# Patient Record
Sex: MALE | Race: WHITE | NOT HISPANIC OR LATINO | Employment: UNEMPLOYED | URBAN - NONMETROPOLITAN AREA
[De-identification: names, ages, dates, MRNs, and addresses within clinical notes are randomized per-mention and may not be internally consistent; named-entity substitution may affect disease eponyms.]

---

## 2019-03-20 ENCOUNTER — OFFICE VISIT (OUTPATIENT)
Dept: FAMILY MEDICINE CLINIC | Facility: CLINIC | Age: 59
End: 2019-03-20

## 2019-03-20 ENCOUNTER — APPOINTMENT (OUTPATIENT)
Dept: LAB | Facility: HOSPITAL | Age: 59
End: 2019-03-20

## 2019-03-20 VITALS
HEIGHT: 68 IN | WEIGHT: 156.9 LBS | BODY MASS INDEX: 23.78 KG/M2 | OXYGEN SATURATION: 99 % | HEART RATE: 66 BPM | SYSTOLIC BLOOD PRESSURE: 110 MMHG | DIASTOLIC BLOOD PRESSURE: 80 MMHG

## 2019-03-20 DIAGNOSIS — Z13.1 SCREENING FOR DIABETES MELLITUS (DM): ICD-10-CM

## 2019-03-20 DIAGNOSIS — R73.9 HYPERGLYCEMIA: ICD-10-CM

## 2019-03-20 DIAGNOSIS — R53.83 FATIGUE, UNSPECIFIED TYPE: ICD-10-CM

## 2019-03-20 DIAGNOSIS — Z11.59 NEED FOR HEPATITIS C SCREENING TEST: ICD-10-CM

## 2019-03-20 DIAGNOSIS — Z13.220 SCREENING FOR HYPERLIPIDEMIA: ICD-10-CM

## 2019-03-20 DIAGNOSIS — Z00.00 ANNUAL PHYSICAL EXAM: Primary | ICD-10-CM

## 2019-03-20 DIAGNOSIS — Z13.0 SCREENING FOR IRON DEFICIENCY ANEMIA: ICD-10-CM

## 2019-03-20 LAB
ALBUMIN SERPL-MCNC: 4.2 G/DL (ref 3.4–4.8)
ALBUMIN/GLOB SERPL: 1.3 G/DL (ref 1.1–1.8)
ALP SERPL-CCNC: 78 U/L (ref 38–126)
ALT SERPL W P-5'-P-CCNC: 28 U/L (ref 21–72)
ANION GAP SERPL CALCULATED.3IONS-SCNC: 7 MMOL/L (ref 5–15)
ARTICHOKE IGE QN: 115 MG/DL (ref 1–129)
AST SERPL-CCNC: 49 U/L (ref 17–59)
BASOPHILS # BLD AUTO: 0.07 10*3/MM3 (ref 0–0.2)
BASOPHILS NFR BLD AUTO: 0.9 % (ref 0–1.5)
BILIRUB SERPL-MCNC: 0.4 MG/DL (ref 0.2–1.3)
BUN BLD-MCNC: 14 MG/DL (ref 7–21)
BUN/CREAT SERPL: 15.7 (ref 7–25)
CALCIUM SPEC-SCNC: 9.4 MG/DL (ref 8.4–10.2)
CHLORIDE SERPL-SCNC: 98 MMOL/L (ref 95–110)
CHOLEST SERPL-MCNC: 225 MG/DL (ref 0–199)
CO2 SERPL-SCNC: 29 MMOL/L (ref 22–31)
CREAT BLD-MCNC: 0.89 MG/DL (ref 0.7–1.3)
DEPRECATED RDW RBC AUTO: 37.6 FL (ref 37–54)
EOSINOPHIL # BLD AUTO: 0.15 10*3/MM3 (ref 0–0.4)
EOSINOPHIL NFR BLD AUTO: 1.9 % (ref 0.3–6.2)
ERYTHROCYTE [DISTWIDTH] IN BLOOD BY AUTOMATED COUNT: 12 % (ref 12.3–15.4)
GFR SERPL CREATININE-BSD FRML MDRD: 87 ML/MIN/1.73 (ref 56–130)
GLOBULIN UR ELPH-MCNC: 3.2 GM/DL (ref 2.3–3.5)
GLUCOSE BLD-MCNC: 90 MG/DL (ref 60–100)
HBA1C MFR BLD: 5.1 % (ref 4–5.6)
HCT VFR BLD AUTO: 42.4 % (ref 37.5–51)
HCV AB SER DONR QL: NEGATIVE
HDLC SERPL-MCNC: 68 MG/DL (ref 60–200)
HGB BLD-MCNC: 14.6 G/DL (ref 13–17.7)
IMM GRANULOCYTES # BLD AUTO: 0.02 10*3/MM3 (ref 0–0.05)
IMM GRANULOCYTES NFR BLD AUTO: 0.3 % (ref 0–0.5)
LDLC/HDLC SERPL: 1.91 {RATIO} (ref 0–3.55)
LYMPHOCYTES # BLD AUTO: 1.88 10*3/MM3 (ref 0.7–3.1)
LYMPHOCYTES NFR BLD AUTO: 24 % (ref 19.6–45.3)
MCH RBC QN AUTO: 29.6 PG (ref 26.6–33)
MCHC RBC AUTO-ENTMCNC: 34.4 G/DL (ref 31.5–35.7)
MCV RBC AUTO: 86 FL (ref 79–97)
MONOCYTES # BLD AUTO: 0.58 10*3/MM3 (ref 0.1–0.9)
MONOCYTES NFR BLD AUTO: 7.4 % (ref 5–12)
NEUTROPHILS # BLD AUTO: 5.13 10*3/MM3 (ref 1.4–7)
NEUTROPHILS NFR BLD AUTO: 65.5 % (ref 42.7–76)
NRBC BLD AUTO-RTO: 0 /100 WBC (ref 0–0)
PLATELET # BLD AUTO: 286 10*3/MM3 (ref 140–450)
PMV BLD AUTO: 11 FL (ref 6–12)
POTASSIUM BLD-SCNC: 4.2 MMOL/L (ref 3.5–5.1)
PROT SERPL-MCNC: 7.4 G/DL (ref 6.3–8.6)
RBC # BLD AUTO: 4.93 10*6/MM3 (ref 4.14–5.8)
SODIUM BLD-SCNC: 134 MMOL/L (ref 137–145)
TRIGL SERPL-MCNC: 136 MG/DL (ref 20–199)
WBC NRBC COR # BLD: 7.83 10*3/MM3 (ref 3.4–10.8)

## 2019-03-20 PROCEDURE — 99386 PREV VISIT NEW AGE 40-64: CPT | Performed by: FAMILY MEDICINE

## 2019-03-20 PROCEDURE — 86803 HEPATITIS C AB TEST: CPT | Performed by: FAMILY MEDICINE

## 2019-03-20 PROCEDURE — 83036 HEMOGLOBIN GLYCOSYLATED A1C: CPT | Performed by: FAMILY MEDICINE

## 2019-03-20 PROCEDURE — 80053 COMPREHEN METABOLIC PANEL: CPT | Performed by: FAMILY MEDICINE

## 2019-03-20 PROCEDURE — 36415 COLL VENOUS BLD VENIPUNCTURE: CPT | Performed by: FAMILY MEDICINE

## 2019-03-20 PROCEDURE — 80061 LIPID PANEL: CPT | Performed by: FAMILY MEDICINE

## 2019-03-20 PROCEDURE — 85025 COMPLETE CBC W/AUTO DIFF WBC: CPT | Performed by: FAMILY MEDICINE

## 2019-03-20 NOTE — PROGRESS NOTES
Subjective:     Bhavesh Pearson is a 58 y.o. male who presents to [facility] care and for an annual checkup:    She is having no problems today.  He reports he is very active.  He rides his bicycle around town.    Preventative:    On osteoporosis therapy?Not Indicated     Past Medical Hx:  History reviewed. No pertinent past medical history.    Past Surgical Hx:  Past Surgical History:   Procedure Laterality Date   • CLAVICLE SURGERY         Health Maintenance:  Health Maintenance   Topic Date Due   • TDAP/TD VACCINES (1 - Tdap) 11/21/1978   • ZOSTER VACCINE (1 of 2) 11/21/2009   • MEDICARE ANNUAL WELLNESS  03/20/2019   • COLONOSCOPY  11/22/2021   • HEPATITIS C SCREENING  Completed   • INFLUENZA VACCINE  Completed       Current Meds:  No current outpatient medications on file.    Allergies:  Patient has no known allergies.    Family Hx:  Family History   Problem Relation Age of Onset   • Throat cancer Mother    • Hypertension Mother    • Hypertension Father    • Coronary artery disease Father    • Diabetes Sister         Social History:  Social History     Socioeconomic History   • Marital status: Single     Spouse name: Not on file   • Number of children: Not on file   • Years of education: Not on file   • Highest education level: Not on file   Tobacco Use   • Smoking status: Never Smoker   • Smokeless tobacco: Never Used   Substance and Sexual Activity   • Alcohol use: No     Frequency: Never   • Drug use: No   • Sexual activity: Defer       Review of Systems  Review of Systems   Constitutional: Negative for chills and fever.   HENT: Negative for congestion, ear pain, hearing loss, rhinorrhea, sore throat and trouble swallowing.    Eyes: Negative for pain and visual disturbance.   Respiratory: Negative for cough, shortness of breath and wheezing.    Cardiovascular: Negative for chest pain and palpitations.   Gastrointestinal: Negative for abdominal pain, constipation, diarrhea, nausea and vomiting.  "  Genitourinary: Negative for dysuria and hematuria.   Musculoskeletal: Negative for back pain and neck pain.   Skin: Negative for rash and wound.   Neurological: Negative for dizziness, syncope, weakness and headaches.   Psychiatric/Behavioral: Negative for dysphoric mood and sleep disturbance. The patient is not nervous/anxious.        Objective:     /80   Pulse 66   Ht 172.7 cm (68\")   Wt 71.2 kg (156 lb 14.4 oz)   SpO2 99%   BMI 23.86 kg/m²   Physical Exam   Constitutional: He is oriented to person, place, and time. He appears well-developed and well-nourished.   HENT:   Head: Normocephalic and atraumatic.   Mouth/Throat: Oropharynx is clear and moist.   Eyes: Conjunctivae and EOM are normal. Pupils are equal, round, and reactive to light.   Neck: Normal range of motion. Neck supple. No tracheal deviation present. No thyromegaly present.   Cardiovascular: Normal rate, regular rhythm and normal heart sounds. Exam reveals no gallop and no friction rub.   No murmur heard.  Pulmonary/Chest: Effort normal and breath sounds normal. No respiratory distress. He has no wheezes. He has no rales.   Abdominal: Soft. Bowel sounds are normal. He exhibits no distension. There is no tenderness.   Musculoskeletal: Normal range of motion. He exhibits no tenderness.   Lymphadenopathy:     He has no cervical adenopathy.   Neurological: He is alert and oriented to person, place, and time. No cranial nerve deficit.   Skin: Skin is warm and dry. No rash noted. No erythema.   Psychiatric: He has a normal mood and affect. His behavior is normal. Thought content normal.   Vitals reviewed.    Assessment/Plan:     Bhavesh was seen today for establish care.    Diagnoses and all orders for this visit:    Annual physical exam: The patient is here for annual physical exam.    Screening for diabetes mellitus (DM)/Hyperglycemia: I will order hemoglobin A1c for screening for diabetes mellitus.  -     Hemoglobin A1c    Screening for " hyperlipidemia: I will order lipid panel screen for hyperlipidemia.  -     Lipid Panel    Need for hepatitis C screening test: I will order hepatitis C antibody to screen for hepatitis C.  -     Hepatitis C antibody    Screening for iron deficiency anemia/Fatigue, unspecified type: I will order a CBC to screen for anemia as the patient has a history of anemia with intermittent fatigue  -     CBC w AUTO Differential  -     Comprehensive Metabolic Panel  -     CBC Auto Differential           Follow-up:     Return in about 6 months (around 9/20/2019).      Goals     None          Preventative:    Vaccines Recommended at this visit:   Shingrix and TDaP/TD    Vaccines Received at this visit:  Will go to the health department to receive these.    Screenings Recommended at this visit:  No Screenings offered today. Patient is up to date on all screenings at this time.     Screenings Ordered at this visit:  No screenings were offered today. Patient is up to date on all screenings.     Smoking Status:  Patient has never smoked.    Alcohol Intake:  Patient does not drink    Patient's Body mass index is 23.86 kg/m². BMI is within normal parameters. No follow-up required..         RISK SCORE: 2              This document has been electronically signed by Severo Mello MD on March 25, 2019 6:28 PM

## 2019-03-26 NOTE — PROGRESS NOTES
I have reviewed the notes, assessments, and/or procedures performed by Severo Mello MD, I concur with her/his documentation and assessment and plan for Bhavesh Ryan Pearson.                This document has been electronically signed by Daniel Kirk MD on March 26, 2019 8:11 AM

## 2020-03-16 ENCOUNTER — OFFICE VISIT (OUTPATIENT)
Dept: FAMILY MEDICINE CLINIC | Facility: CLINIC | Age: 60
End: 2020-03-16

## 2020-03-16 ENCOUNTER — LAB (OUTPATIENT)
Dept: LAB | Facility: HOSPITAL | Age: 60
End: 2020-03-16

## 2020-03-16 VITALS
HEIGHT: 68 IN | WEIGHT: 156 LBS | SYSTOLIC BLOOD PRESSURE: 118 MMHG | BODY MASS INDEX: 23.64 KG/M2 | HEART RATE: 60 BPM | OXYGEN SATURATION: 99 % | DIASTOLIC BLOOD PRESSURE: 62 MMHG

## 2020-03-16 DIAGNOSIS — Z00.00 ANNUAL PHYSICAL EXAM: ICD-10-CM

## 2020-03-16 DIAGNOSIS — Z00.00 ANNUAL PHYSICAL EXAM: Primary | ICD-10-CM

## 2020-03-16 LAB
ALBUMIN SERPL-MCNC: 4.4 G/DL (ref 3.5–5.2)
ALBUMIN/GLOB SERPL: 1.6 G/DL
ALP SERPL-CCNC: 82 U/L (ref 39–117)
ALT SERPL W P-5'-P-CCNC: 24 U/L (ref 1–41)
ANION GAP SERPL CALCULATED.3IONS-SCNC: 12.9 MMOL/L (ref 5–15)
AST SERPL-CCNC: 27 U/L (ref 1–40)
BASOPHILS # BLD AUTO: 0.08 10*3/MM3 (ref 0–0.2)
BASOPHILS NFR BLD AUTO: 1.1 % (ref 0–1.5)
BILIRUB SERPL-MCNC: 0.3 MG/DL (ref 0.2–1.2)
BUN BLD-MCNC: 6 MG/DL (ref 8–23)
BUN/CREAT SERPL: 6.4 (ref 7–25)
CALCIUM SPEC-SCNC: 9.7 MG/DL (ref 8.6–10.5)
CHLORIDE SERPL-SCNC: 98 MMOL/L (ref 98–107)
CHOLEST SERPL-MCNC: 239 MG/DL (ref 0–200)
CO2 SERPL-SCNC: 27.1 MMOL/L (ref 22–29)
CREAT BLD-MCNC: 0.94 MG/DL (ref 0.76–1.27)
DEPRECATED RDW RBC AUTO: 38.5 FL (ref 37–54)
EOSINOPHIL # BLD AUTO: 0.28 10*3/MM3 (ref 0–0.4)
EOSINOPHIL NFR BLD AUTO: 3.8 % (ref 0.3–6.2)
ERYTHROCYTE [DISTWIDTH] IN BLOOD BY AUTOMATED COUNT: 12.8 % (ref 12.3–15.4)
GFR SERPL CREATININE-BSD FRML MDRD: 82 ML/MIN/1.73
GLOBULIN UR ELPH-MCNC: 2.7 GM/DL
GLUCOSE BLD-MCNC: 90 MG/DL (ref 65–99)
HCT VFR BLD AUTO: 42.2 % (ref 37.5–51)
HDLC SERPL-MCNC: 52 MG/DL (ref 40–60)
HGB BLD-MCNC: 14.7 G/DL (ref 13–17.7)
IMM GRANULOCYTES # BLD AUTO: 0.02 10*3/MM3 (ref 0–0.05)
IMM GRANULOCYTES NFR BLD AUTO: 0.3 % (ref 0–0.5)
LDLC SERPL CALC-MCNC: 122 MG/DL (ref 0–100)
LDLC/HDLC SERPL: 2.35 {RATIO}
LYMPHOCYTES # BLD AUTO: 2.1 10*3/MM3 (ref 0.7–3.1)
LYMPHOCYTES NFR BLD AUTO: 28.5 % (ref 19.6–45.3)
MCH RBC QN AUTO: 29.2 PG (ref 26.6–33)
MCHC RBC AUTO-ENTMCNC: 34.8 G/DL (ref 31.5–35.7)
MCV RBC AUTO: 83.9 FL (ref 79–97)
MONOCYTES # BLD AUTO: 0.65 10*3/MM3 (ref 0.1–0.9)
MONOCYTES NFR BLD AUTO: 8.8 % (ref 5–12)
NEUTROPHILS # BLD AUTO: 4.25 10*3/MM3 (ref 1.7–7)
NEUTROPHILS NFR BLD AUTO: 57.5 % (ref 42.7–76)
NRBC BLD AUTO-RTO: 0 /100 WBC (ref 0–0.2)
PLATELET # BLD AUTO: 312 10*3/MM3 (ref 140–450)
PMV BLD AUTO: 11.5 FL (ref 6–12)
POTASSIUM BLD-SCNC: 4.4 MMOL/L (ref 3.5–5.2)
PROT SERPL-MCNC: 7.1 G/DL (ref 6–8.5)
RBC # BLD AUTO: 5.03 10*6/MM3 (ref 4.14–5.8)
SODIUM BLD-SCNC: 138 MMOL/L (ref 136–145)
TRIGL SERPL-MCNC: 324 MG/DL (ref 0–150)
VLDLC SERPL-MCNC: 64.8 MG/DL (ref 5–40)
WBC NRBC COR # BLD: 7.38 10*3/MM3 (ref 3.4–10.8)

## 2020-03-16 PROCEDURE — 80053 COMPREHEN METABOLIC PANEL: CPT

## 2020-03-16 PROCEDURE — 99213 OFFICE O/P EST LOW 20 MIN: CPT | Performed by: STUDENT IN AN ORGANIZED HEALTH CARE EDUCATION/TRAINING PROGRAM

## 2020-03-16 PROCEDURE — 36415 COLL VENOUS BLD VENIPUNCTURE: CPT

## 2020-03-16 PROCEDURE — 80061 LIPID PANEL: CPT | Performed by: STUDENT IN AN ORGANIZED HEALTH CARE EDUCATION/TRAINING PROGRAM

## 2020-03-16 PROCEDURE — 85025 COMPLETE CBC W/AUTO DIFF WBC: CPT | Performed by: STUDENT IN AN ORGANIZED HEALTH CARE EDUCATION/TRAINING PROGRAM

## 2020-03-16 NOTE — PROGRESS NOTES
Family Medicine Residency  Josh Gamez MD    Subjective:     Bhavesh Pearson is a 59 y.o. male who presents for annual physical.  Patient states he has no complaints.  He rides his bicycle around town.  He denies any headaches, change in his vision, chest pain, shortness of breath, abdominal pain, urinary symptoms, constipation or diarrhea.  He does have chronic bilateral knee pain for which he treats with topical analgesics.  Colonoscopy was performed 9 years ago.    The following portions of the patient's history were reviewed and updated as appropriate: allergies, current medications, past family history, past medical history, past social history, past surgical history and problem list.    Past Medical Hx:  History reviewed. No pertinent past medical history.    Past Surgical Hx:  Past Surgical History:   Procedure Laterality Date   • CLAVICLE SURGERY         Current Meds:  No current outpatient medications on file.    Allergies:  No Known Allergies    Family Hx:  Family History   Problem Relation Age of Onset   • Throat cancer Mother    • Hypertension Mother    • Hypertension Father    • Coronary artery disease Father    • Diabetes Sister         Social History:  Social History     Socioeconomic History   • Marital status: Single     Spouse name: Not on file   • Number of children: Not on file   • Years of education: Not on file   • Highest education level: Not on file   Tobacco Use   • Smoking status: Never Smoker   • Smokeless tobacco: Never Used   Substance and Sexual Activity   • Alcohol use: No     Frequency: Never   • Drug use: No   • Sexual activity: Defer       Review of Systems  Review of Systems   Constitutional: Negative for activity change, appetite change and fever.   HENT: Negative for congestion and trouble swallowing.    Respiratory: Negative for chest tightness and shortness of breath.    Cardiovascular: Negative for chest pain and palpitations.   Gastrointestinal: Negative for  "abdominal distention and abdominal pain.   Genitourinary: Negative for difficulty urinating and dysuria.   Musculoskeletal: Positive for arthralgias (Chronic bilateral knees). Negative for myalgias.   Skin: Negative for color change and pallor.   Neurological: Negative for weakness, light-headedness and headaches.   Psychiatric/Behavioral: Negative for agitation and behavioral problems.       Objective:     /62   Pulse 60   Ht 172.7 cm (68\")   Wt 70.8 kg (156 lb)   SpO2 99%   BMI 23.72 kg/m²   Physical Exam   Constitutional: He is oriented to person, place, and time. He appears well-developed and well-nourished.   HENT:   Head: Normocephalic and atraumatic.   Right Ear: External ear normal.   Left Ear: External ear normal.   Nose: Nose normal.   Eyes: Pupils are equal, round, and reactive to light. EOM are normal.   Neck: Normal range of motion. Neck supple.   Cardiovascular: Normal rate, regular rhythm and normal heart sounds. Exam reveals no gallop and no friction rub.   No murmur heard.  Pulmonary/Chest: Effort normal and breath sounds normal. No respiratory distress. He has no wheezes. He has no rales.   Abdominal: Soft. Bowel sounds are normal. He exhibits no distension. There is no tenderness.   Musculoskeletal: Normal range of motion. He exhibits no edema.   Neurological: He is alert and oriented to person, place, and time.   Skin: Skin is warm. Capillary refill takes less than 2 seconds. No erythema.   Psychiatric: He has a normal mood and affect. His behavior is normal.        Assessment/Plan:     Bhavesh was seen today for annual exam.    Diagnoses and all orders for this visit:    Annual physical exam  -     Lipid Panel with starting of moderate intensity statin if continues to be elevated.  Total cholesterol was 225 last year.  -     CBC & Differential  -     Comprehensive metabolic panel; Future  -     CBC Auto Differential        · Rx changes: None  · Patient Education: Possible initiation " of statin depending on lipid panel results  · Compliance at present is estimated to be excellent.   · Efforts to improve compliance (if necessary) will be directed at .  Unnecessary at this time.     Follow-up:     Return in about 1 year (around 3/16/2021) for Annual.    Preventative:  Health Maintenance   Topic Date Due   • TDAP/TD VACCINES (1 - Tdap) 11/21/1970   • ZOSTER VACCINE (1 of 2) 11/21/2009   • MEDICARE ANNUAL WELLNESS  03/20/2019   • INFLUENZA VACCINE  08/01/2019   • COLONOSCOPY  11/22/2021   • HEPATITIS C SCREENING  Completed       Recommended: none  Vaccine Counseling: N/A    Weight  -Class: Normal: 18.5-24.9kg/m2  -Patient's Body mass index is 23.72 kg/m². BMI is within normal parameters. No follow-up required..   eat more fruits and vegetables, decrease soda or juice intake, increase water intake and increase physical activity    Alcohol use:  reports that he does not drink alcohol.  Nicotine status  reports that he has never smoked. He has never used smokeless tobacco.    Goals     • Stay healthy      Barriers: None            RISK SCORE: 2          This document has been electronically signed by Josh Gamez MD on March 16, 2020 11:39

## 2020-03-17 NOTE — PROGRESS NOTES
I have reviewed the notes, assessments, and/or procedures performed by Josh Gamez MD, I concur with her/his documentation and assessment and plan for Bhavesh Davis Pearson.                This document has been electronically signed by Daniel Kirk MD on March 17, 2020 16:39

## 2020-03-19 RX ORDER — ROSUVASTATIN CALCIUM 20 MG/1
20 TABLET, COATED ORAL DAILY
Qty: 30 TABLET | Refills: 2 | Status: SHIPPED | OUTPATIENT
Start: 2020-03-19

## 2020-03-19 NOTE — PROGRESS NOTES
ASCVD risk of 8.6% based on last visit results. Will start Rosuvastatin 20mg daily and recheck lipid panel in 6-8 weeks.          This document has been electronically signed by Josh Gamez MD on March 19, 2020 17:32

## 2023-02-22 ENCOUNTER — HOSPITAL ENCOUNTER (EMERGENCY)
Facility: HOSPITAL | Age: 63
Discharge: HOME | End: 2023-02-22
Attending: EMERGENCY MEDICINE | Admitting: EMERGENCY MEDICINE
Payer: MEDICARE

## 2023-02-22 VITALS
HEART RATE: 69 BPM | WEIGHT: 163 LBS | SYSTOLIC BLOOD PRESSURE: 157 MMHG | DIASTOLIC BLOOD PRESSURE: 83 MMHG | HEIGHT: 65 IN | BODY MASS INDEX: 27.16 KG/M2 | RESPIRATION RATE: 18 BRPM | OXYGEN SATURATION: 98 % | TEMPERATURE: 97.8 F

## 2023-02-22 DIAGNOSIS — S00.81XA ABRASION OF FACE, INITIAL ENCOUNTER: Primary | ICD-10-CM

## 2023-02-22 PROCEDURE — 99283 EMERGENCY DEPT VISIT LOW MDM: CPT

## 2023-02-22 RX ORDER — DIAPER,BRIEF,INFANT-TODD,DISP
1 EACH MISCELLANEOUS ONCE
Status: COMPLETED | OUTPATIENT
Start: 2023-02-22 | End: 2023-02-22

## 2023-02-22 RX ORDER — GINSENG 100 MG
1 CAPSULE ORAL 2 TIMES DAILY
Qty: 14 G | Refills: 0 | Status: SHIPPED | OUTPATIENT
Start: 2023-02-22 | End: 2023-03-01

## 2023-02-22 RX ADMIN — BACITRACIN 1 APPLICATION: 500 OINTMENT TOPICAL at 11:57
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
## 2023-03-20 ENCOUNTER — OFFICE VISIT (OUTPATIENT)
Dept: FAMILY MEDICINE CLINIC | Facility: CLINIC | Age: 63
End: 2023-03-20
Payer: MEDICARE

## 2023-03-20 ENCOUNTER — LAB (OUTPATIENT)
Dept: LAB | Facility: HOSPITAL | Age: 63
End: 2023-03-20
Payer: MEDICARE

## 2023-03-20 VITALS
HEART RATE: 72 BPM | DIASTOLIC BLOOD PRESSURE: 70 MMHG | SYSTOLIC BLOOD PRESSURE: 120 MMHG | TEMPERATURE: 97.3 F | BODY MASS INDEX: 26.79 KG/M2 | OXYGEN SATURATION: 99 % | WEIGHT: 160.8 LBS | HEIGHT: 65 IN

## 2023-03-20 DIAGNOSIS — R35.0 FREQUENT URINATION: ICD-10-CM

## 2023-03-20 DIAGNOSIS — Z12.5 SPECIAL SCREENING FOR MALIGNANT NEOPLASM OF PROSTATE: ICD-10-CM

## 2023-03-20 DIAGNOSIS — R73.9 HYPERGLYCEMIA: ICD-10-CM

## 2023-03-20 DIAGNOSIS — E78.2 MIXED HYPERLIPIDEMIA: ICD-10-CM

## 2023-03-20 DIAGNOSIS — R35.0 FREQUENT URINATION: Primary | ICD-10-CM

## 2023-03-20 LAB
ALBUMIN SERPL-MCNC: 4.3 G/DL (ref 3.5–5.2)
ALBUMIN/GLOB SERPL: 1.4 G/DL
ALP SERPL-CCNC: 79 U/L (ref 39–117)
ALT SERPL W P-5'-P-CCNC: 25 U/L (ref 1–41)
ANION GAP SERPL CALCULATED.3IONS-SCNC: 6.8 MMOL/L (ref 5–15)
AST SERPL-CCNC: 30 U/L (ref 1–40)
BASOPHILS # BLD AUTO: 0.08 10*3/MM3 (ref 0–0.2)
BASOPHILS NFR BLD AUTO: 1.3 % (ref 0–1.5)
BILIRUB SERPL-MCNC: 0.3 MG/DL (ref 0–1.2)
BUN SERPL-MCNC: 11 MG/DL (ref 8–23)
BUN/CREAT SERPL: 12.2 (ref 7–25)
CALCIUM SPEC-SCNC: 9.9 MG/DL (ref 8.6–10.5)
CHLORIDE SERPL-SCNC: 101 MMOL/L (ref 98–107)
CHOLEST SERPL-MCNC: 219 MG/DL (ref 0–200)
CO2 SERPL-SCNC: 30.2 MMOL/L (ref 22–29)
CREAT SERPL-MCNC: 0.9 MG/DL (ref 0.76–1.27)
DEPRECATED RDW RBC AUTO: 38.9 FL (ref 37–54)
EGFRCR SERPLBLD CKD-EPI 2021: 96 ML/MIN/1.73
EOSINOPHIL # BLD AUTO: 0.18 10*3/MM3 (ref 0–0.4)
EOSINOPHIL NFR BLD AUTO: 2.9 % (ref 0.3–6.2)
ERYTHROCYTE [DISTWIDTH] IN BLOOD BY AUTOMATED COUNT: 12.6 % (ref 12.3–15.4)
GLOBULIN UR ELPH-MCNC: 3 GM/DL
GLUCOSE SERPL-MCNC: 85 MG/DL (ref 65–99)
HBA1C MFR BLD: 5 % (ref 4.8–5.6)
HCT VFR BLD AUTO: 45.3 % (ref 37.5–51)
HDLC SERPL-MCNC: 61 MG/DL (ref 40–60)
HGB BLD-MCNC: 15.3 G/DL (ref 13–17.7)
IMM GRANULOCYTES # BLD AUTO: 0.01 10*3/MM3 (ref 0–0.05)
IMM GRANULOCYTES NFR BLD AUTO: 0.2 % (ref 0–0.5)
LDLC SERPL CALC-MCNC: 137 MG/DL (ref 0–100)
LDLC/HDLC SERPL: 2.2 {RATIO}
LYMPHOCYTES # BLD AUTO: 1.73 10*3/MM3 (ref 0.7–3.1)
LYMPHOCYTES NFR BLD AUTO: 27.4 % (ref 19.6–45.3)
MCH RBC QN AUTO: 29.1 PG (ref 26.6–33)
MCHC RBC AUTO-ENTMCNC: 33.8 G/DL (ref 31.5–35.7)
MCV RBC AUTO: 86.1 FL (ref 79–97)
MONOCYTES # BLD AUTO: 0.63 10*3/MM3 (ref 0.1–0.9)
MONOCYTES NFR BLD AUTO: 10 % (ref 5–12)
NEUTROPHILS NFR BLD AUTO: 3.68 10*3/MM3 (ref 1.7–7)
NEUTROPHILS NFR BLD AUTO: 58.2 % (ref 42.7–76)
NRBC BLD AUTO-RTO: 0 /100 WBC (ref 0–0.2)
PLATELET # BLD AUTO: 287 10*3/MM3 (ref 140–450)
PMV BLD AUTO: 10.5 FL (ref 6–12)
POTASSIUM SERPL-SCNC: 4.1 MMOL/L (ref 3.5–5.2)
PROT SERPL-MCNC: 7.3 G/DL (ref 6–8.5)
PSA SERPL-MCNC: 46.3 NG/ML (ref 0–4)
RBC # BLD AUTO: 5.26 10*6/MM3 (ref 4.14–5.8)
SODIUM SERPL-SCNC: 138 MMOL/L (ref 136–145)
TRIGL SERPL-MCNC: 118 MG/DL (ref 0–150)
VLDLC SERPL-MCNC: 21 MG/DL (ref 5–40)
WBC NRBC COR # BLD: 6.31 10*3/MM3 (ref 3.4–10.8)

## 2023-03-20 PROCEDURE — 81001 URINALYSIS AUTO W/SCOPE: CPT

## 2023-03-20 PROCEDURE — 85025 COMPLETE CBC W/AUTO DIFF WBC: CPT

## 2023-03-20 PROCEDURE — 36415 COLL VENOUS BLD VENIPUNCTURE: CPT

## 2023-03-20 PROCEDURE — 83036 HEMOGLOBIN GLYCOSYLATED A1C: CPT

## 2023-03-20 PROCEDURE — G0103 PSA SCREENING: HCPCS

## 2023-03-20 PROCEDURE — 80061 LIPID PANEL: CPT

## 2023-03-20 PROCEDURE — 80053 COMPREHEN METABOLIC PANEL: CPT

## 2023-03-20 RX ORDER — TAMSULOSIN HYDROCHLORIDE 0.4 MG/1
1 CAPSULE ORAL DAILY
Qty: 30 CAPSULE | Refills: 1 | Status: SHIPPED | OUTPATIENT
Start: 2023-03-20

## 2023-03-20 NOTE — PROGRESS NOTES
Family Medicine Residency  Mario Vásquez MD    Subjective:     Bhavesh Pearson III is a 62 y.o. male who presents for urinary urgency and frequency.     Of note, Mr. Pearson has had poor follow up. He has not been seen in our clinic since 3/19/2020, almost exactly three years ago today. He does not have a medical history and the only medication on his chart, crestor, is one he is not taking. He will need subsequent visits with me to address care gaps and optimize medical therapy. However, for today, we will focus on his current complaint.     Mr. Pearson complains of increased urinary urgency and frequency. This has had gradual onset and has become more pronounced over the last 3-5 years. He has had storage symptoms, including nighttime urination, urgency, and occasional urinary incontinence. He has had void symptoms, including a weaker stream and a stream that can start and stop. He denies straining to void and denies significant hesitancy. He has a history of smoking (tobacco pipe) but denies constitutional weight loss and fatigue. He denies a personal history of cancer.       The following portions of the patient's history were reviewed and updated as appropriate: allergies, current medications, past family history, past medical history, past social history, past surgical history and problem list.    Past Medical Hx:  History reviewed. No pertinent past medical history.    Past Surgical Hx:  Past Surgical History:   Procedure Laterality Date   • CLAVICLE SURGERY         Current Meds:    Current Outpatient Medications:   •  rosuvastatin (CRESTOR) 20 MG tablet, Take 1 tablet by mouth Daily. (Patient not taking: Reported on 3/20/2023), Disp: 30 tablet, Rfl: 2  •  tamsulosin (FLOMAX) 0.4 MG capsule 24 hr capsule, Take 1 capsule by mouth Daily., Disp: 30 capsule, Rfl: 1    Allergies:  No Known Allergies    Family Hx:  Family History   Problem Relation Age of Onset   • Throat cancer Mother    • Hypertension Mother   "  • Hypertension Father    • Coronary artery disease Father    • Diabetes Sister         Social History:  Social History     Socioeconomic History   • Marital status: Single   Tobacco Use   • Smoking status: Never   • Smokeless tobacco: Never   Substance and Sexual Activity   • Alcohol use: No   • Drug use: No   • Sexual activity: Defer       Review of Systems  Review of Systems   Constitutional: Negative for fatigue and unexpected weight change.   Genitourinary: Positive for enuresis, frequency and urgency. Negative for difficulty urinating, dysuria, flank pain, genital sores, hematuria, penile discharge, penile pain, penile swelling, scrotal swelling and testicular pain.   Neurological: Negative for dizziness and light-headedness.       Objective:     /70   Pulse 72   Temp 97.3 °F (36.3 °C)   Ht 165.1 cm (65\")   Wt 72.9 kg (160 lb 12.8 oz)   SpO2 99%   BMI 26.76 kg/m²   Physical Exam  Constitutional:       General: He is not in acute distress.     Appearance: Normal appearance. He is not ill-appearing, toxic-appearing or diaphoretic.   HENT:      Head: Normocephalic and atraumatic.   Eyes:      Extraocular Movements: Extraocular movements intact.   Pulmonary:      Effort: Pulmonary effort is normal. No respiratory distress.   Musculoskeletal:      Cervical back: Normal range of motion.   Neurological:      Mental Status: He is alert.   Psychiatric:         Mood and Affect: Mood normal.         Behavior: Behavior normal.          Assessment/Plan:     Diagnoses and all orders for this visit:    1. Frequent urination (Primary)    Of note, Mr. Pearson has had poor follow up. He has not been seen in our clinic since 3/19/2020, almost exactly three years ago today. He does not have a medical history and the only medication on his chart, crestor, is one he is not taking. He will need subsequent visits with me to address care gaps and optimize medical therapy. However, for today, we will focus on his current " complaint.     Mr. Pearson complains of increased urinary urgency and frequency. This has had gradual onset and has become more pronounced over the last 3-5 years. He has had storage symptoms, including nighttime urination, urgency, and occasional urinary incontinence. He has had void symptoms, including a weaker stream and a stream that can start and stop. He denies straining to void and denies significant hesitancy. He has a history of smoking (tobacco pipe) but denies constitutional weight loss and fatigue. He denies a personal history of cancer.     I think patient symptoms are consistent with BPH. Will start trial of flomax and see him in two weeks time to reassess for improvement. Discussed potential side effects of medication, including risk of orthostatic hypotension, and counseled him to minimize position changes.    I will order a Urinalysis to look for blood or any abnormal findings consistent with increased urination. I will order a CMP to check renal function.    I will also order a PSA. I may also start patient on finasteride in the future based on response to treatment.     Potential indications for referral to urology would be presence of hematuria, urinary retention, dysuria, abnormality on ALESSANDRA, elevated PSA, or suspicion of urological disease, or incontinence. If patient continues to have incontinence despite treatment with flomax, or any of the above, will discuss referral to urology. I think this is a reasonable place to start.           -     tamsulosin (FLOMAX) 0.4 MG capsule 24 hr capsule; Take 1 capsule by mouth Daily.  Dispense: 30 capsule; Refill: 1    -     Urinalysis With Culture If Indicated -; Future  -     Comprehensive metabolic panel; Future  -     CBC w AUTO Differential; Future  -     PSA SCREENING; Future      2. Poor follow up     Of note, Mr. Pearson has had poor follow up. He has not been seen in our clinic since 3/19/2020, almost exactly three years ago today. He does not have a  medical history and the only medication on his chart, crestor, is one he is not taking. He will need subsequent visits with me to address care gaps and optimize medical therapy. However, for today, we will focus on his current complaint.             -    CBC, CMP  -     Lipid panel; Future  -     Hemoglobin A1c; Future        · Rx changes: Starting flomax      Follow-up:     Return in about 2 weeks (around 4/3/2023) for Recheck.    Preventative:  Health Maintenance   Topic Date Due   • COVID-19 Vaccine (1) Never done   • ZOSTER VACCINE (1 of 2) Never done   • ANNUAL WELLNESS VISIT  Never done   • COLORECTAL CANCER SCREENING  11/22/2021   • TDAP/TD VACCINES (2 - Td or Tdap) 03/12/2022   • INFLUENZA VACCINE  08/01/2022   • LIPID PANEL  03/20/2023   • HEPATITIS C SCREENING  Completed   • Pneumococcal Vaccine 0-64  Aged Out     Alcohol use:  reports no history of alcohol use.  Nicotine status  reports that he has never smoked. He has never used smokeless tobacco.     Goals     • Stay healthy      Barriers: None            RISK SCORE: 4      This document has been electronically signed by Mario Vásquez MD on March 20, 2023 10:00 CDT

## 2023-03-21 LAB
BACTERIA UR QL AUTO: ABNORMAL /HPF
BILIRUB UR QL STRIP: NEGATIVE
CLARITY UR: ABNORMAL
COD CRY URNS QL: ABNORMAL /HPF
COLOR UR: YELLOW
GLUCOSE UR STRIP-MCNC: NEGATIVE MG/DL
HGB UR QL STRIP.AUTO: ABNORMAL
HYALINE CASTS UR QL AUTO: ABNORMAL /LPF
KETONES UR QL STRIP: ABNORMAL
LEUKOCYTE ESTERASE UR QL STRIP.AUTO: NEGATIVE
NITRITE UR QL STRIP: NEGATIVE
PH UR STRIP.AUTO: 6 [PH] (ref 5–8)
PROT UR QL STRIP: ABNORMAL
RBC # UR STRIP: ABNORMAL /HPF
REF LAB TEST METHOD: ABNORMAL
SP GR UR STRIP: 1.03 (ref 1–1.03)
SQUAMOUS #/AREA URNS HPF: ABNORMAL /HPF
UROBILINOGEN UR QL STRIP: ABNORMAL
WBC # UR STRIP: ABNORMAL /HPF

## 2023-03-23 DIAGNOSIS — R97.20 ELEVATED PROSTATE SPECIFIC ANTIGEN (PSA): Primary | ICD-10-CM

## 2023-03-23 NOTE — PROGRESS NOTES
Patient has results concerning for potential malignancy.  I have attempted to call patient to go over results.  There is only one phone number listed in the chart for him that I am able to see - [phone] - and this number is incorrect.  The woman who answers this phone says she has nothing to do with the patient and does not know him and it is an incorrect number.  I have reached out to our office staff for help contacting this patient in getting him into the clinic for an in person visit for me to go over his lab results and discuss referral to urology.    ?  This document has been electronically signed by Mario Vásquez MD on March 23, 2023 17:34 CDT

## 2023-03-28 ENCOUNTER — TELEPHONE (OUTPATIENT)
Dept: FAMILY MEDICINE CLINIC | Facility: CLINIC | Age: 63
End: 2023-03-28
Payer: MEDICARE

## 2023-03-28 NOTE — TELEPHONE ENCOUNTER
Left voice mail to return call in reference to referral appt date & time [facility]

## 2023-04-28 ENCOUNTER — OFFICE VISIT (OUTPATIENT)
Dept: FAMILY MEDICINE CLINIC | Facility: CLINIC | Age: 63
End: 2023-04-28
Payer: MEDICARE

## 2023-04-28 DIAGNOSIS — R97.20 ELEVATED PSA: ICD-10-CM

## 2023-04-28 DIAGNOSIS — R97.20 ELEVATED PSA, GREATER THAN OR EQUAL TO 20 NG/ML: Primary | ICD-10-CM

## 2023-04-28 NOTE — PROGRESS NOTES
Family Medicine Residency  Mario Vásquez MD    Subjective:     You have chosen to receive care through a telephone visit. Do you consent to use a telephone visit for your medical care today? Yes  Approximately 10 to 15 minutes were spent on the duration of this patient's encounter.    Bhavesh Pearson III is a 62 y.o. male who presents for follow-up for abnormally elevated PSA greater than 20.    I saw Mr. Pearson for the first time earlier this year on 3/20/2022.  At that time patient had complained of symptoms consistent with BPH including urinary incontinence, urgency, and increased frequency, especially at night.  I obtained urinalysis, screening PSA and started patient on Flomax.  Urinalysis did not demonstrate urinary tract infection but did show blood.  Screening PSA came back significantly elevated at 46.3.  I had tried without success to contact the patient, as the only phone number in the chart that had been provided was incorrect.  Fortunately, when the patient called to cancel his follow-up appointment today, we were able to successfully obtain his phone number.  Although I would prefer to deliver sensitive results of this nature in person rather than over the telephone, this patient needed these results due to the potential for malignancy and agreed to a telephone visit.    Patient said he has had some improvement of symptoms on Flomax but does continue to have some residual symptoms.  We discussed the results of his urinalysis and PSA and what they might mean.  I answered all his questions.  I will order a repeat diagnostic PSA and work to schedule the patient with follow-up for urology.  I told him and his daughter to get him scheduled for a repeat visit with me in person to make sure he is not lost to follow-up as we have only had 1 in person appointment prior to this and he does have other healthcare needs for us to address.    The following portions of the patient's history were reviewed and  updated as appropriate: allergies, current medications, past family history, past medical history, past social history, past surgical history and problem list.    Past Medical Hx:  No past medical history on file.    Past Surgical Hx:  Past Surgical History:   Procedure Laterality Date   • CLAVICLE SURGERY         Current Meds:    Current Outpatient Medications:   •  rosuvastatin (CRESTOR) 20 MG tablet, Take 1 tablet by mouth Daily. (Patient not taking: Reported on 3/20/2023), Disp: 30 tablet, Rfl: 2  •  tamsulosin (FLOMAX) 0.4 MG capsule 24 hr capsule, Take 1 capsule by mouth Daily., Disp: 30 capsule, Rfl: 1    Allergies:  No Known Allergies    Family Hx:  Family History   Problem Relation Age of Onset   • Throat cancer Mother    • Hypertension Mother    • Hypertension Father    • Coronary artery disease Father    • Diabetes Sister         Social History:  Social History     Socioeconomic History   • Marital status: Single   Tobacco Use   • Smoking status: Never   • Smokeless tobacco: Never   Substance and Sexual Activity   • Alcohol use: No   • Drug use: No   • Sexual activity: Defer       Review of Systems  Review of Systems   Genitourinary: Positive for difficulty urinating, frequency and urgency. Negative for dysuria.       Objective:     This was a telephone interview.  No physical exam or vital signs could be obtained.       Assessment/Plan:     Diagnoses and all orders for this visit:    1. Elevated PSA, greater than or equal to 20 ng/ml (Primary)      Bhavesh Pearson III is a 62 y.o. male who presents for follow-up for abnormally elevated PSA greater than 20.    I saw Mr. Pearson for the first time earlier this year on 3/20/2022.  At that time patient had complained of symptoms consistent with BPH including urinary incontinence, urgency, and increased frequency, especially at night.  I obtained urinalysis, screening PSA and started patient on Flomax.  Urinalysis did not demonstrate urinary tract infection  but did show blood.  Screening PSA came back significantly elevated at 46.3.  I had tried without success to contact the patient, as the only phone number in the chart that had been provided was incorrect.  Fortunately, when the patient called to cancel his follow-up appointment today, we were able to successfully obtain his phone number.  Although I would prefer to deliver sensitive results of this nature in person rather than over the telephone, this patient needed these results due to the potential for malignancy and agreed to a telephone visit.    Patient said he has had some improvement of symptoms on Flomax but does continue to have some residual symptoms.  We discussed the results of his urinalysis and PSA and what they might mean.  I answered all his questions.  I will order a repeat diagnostic PSA and work to schedule the patient with follow-up for urology.  I told him and his daughter to get him scheduled for a repeat visit with me in person to make sure he is not lost to follow-up as we have only had 1 in person appointment prior to this and he does have other healthcare needs for us to address.    -     PSA DIAGNOSTIC ONLY; Future  -Urology referral          · Rx changes: None     Follow-up:     Return in about 2 weeks (around 5/12/2023) for Prostate.    Preventative:  Health Maintenance   Topic Date Due   • COVID-19 Vaccine (1) Never done   • ZOSTER VACCINE (1 of 2) Never done   • ANNUAL WELLNESS VISIT  Never done   • COLORECTAL CANCER SCREENING  11/22/2021   • TDAP/TD VACCINES (2 - Td or Tdap) 03/12/2022   • INFLUENZA VACCINE  08/01/2023   • LIPID PANEL  03/20/2024   • HEPATITIS C SCREENING  Completed   • Pneumococcal Vaccine 0-64  Aged Out         Alcohol use:  reports no history of alcohol use.  Nicotine status  reports that he has never smoked. He has never used smokeless tobacco.     Goals     • Stay healthy      Barriers: None            RISK SCORE: 4      This document has been electronically  signed by Mario Vásquez MD on April 28, 2023 15:06 CDT

## 2023-04-28 NOTE — PROGRESS NOTES
I have reviewed the notes, assessments, and/or procedures performed by Mario Vásquez MD , I concur with documentation of Bhavesh Pearson III.

## 2023-05-31 DIAGNOSIS — R35.0 FREQUENT URINATION: ICD-10-CM

## 2023-05-31 RX ORDER — TAMSULOSIN HYDROCHLORIDE 0.4 MG/1
1 CAPSULE ORAL DAILY
Qty: 30 CAPSULE | Refills: 1 | Status: SHIPPED | OUTPATIENT
Start: 2023-05-31

## 2023-08-17 ENCOUNTER — PREP FOR SURGERY (OUTPATIENT)
Dept: OTHER | Facility: HOSPITAL | Age: 63
End: 2023-08-17
Payer: MEDICARE

## 2023-08-17 DIAGNOSIS — R97.20 ELEVATED PROSTATE SPECIFIC ANTIGEN (PSA): Primary | ICD-10-CM

## 2023-08-17 RX ORDER — GENTAMICIN SULFATE 40 MG/ML
40 INJECTION, SOLUTION INTRAMUSCULAR; INTRAVENOUS EVERY 12 HOURS
OUTPATIENT
Start: 2023-08-17 | End: 2023-08-18

## 2023-09-06 ENCOUNTER — HOSPITAL ENCOUNTER (OUTPATIENT)
Dept: ULTRASOUND IMAGING | Facility: HOSPITAL | Age: 63
Discharge: HOME | End: 2023-09-06
Payer: MEDICARE

## 2023-09-06 VITALS
DIASTOLIC BLOOD PRESSURE: 88 MMHG | HEART RATE: 75 BPM | OXYGEN SATURATION: 87 % | RESPIRATION RATE: 20 BRPM | SYSTOLIC BLOOD PRESSURE: 149 MMHG

## 2023-09-06 DIAGNOSIS — R97.20 ELEVATED PROSTATE SPECIFIC ANTIGEN (PSA): ICD-10-CM

## 2023-09-06 PROCEDURE — 76942 ECHO GUIDE FOR BIOPSY: CPT

## 2023-09-06 PROCEDURE — A9270 NON-COVERED ITEM OR SERVICE: HCPCS | Performed by: UROLOGY

## 2023-09-06 PROCEDURE — 63710000001 DIAZEPAM 5 MG TABLET: Performed by: UROLOGY

## 2023-09-06 RX ORDER — DIAZEPAM 5 MG/1
TABLET ORAL
Status: DISPENSED
Start: 2023-09-06 | End: 2023-09-06

## 2023-09-06 RX ORDER — DIAZEPAM 5 MG/1
TABLET ORAL AS NEEDED
Status: COMPLETED | OUTPATIENT
Start: 2023-09-06 | End: 2023-09-06

## 2023-09-06 RX ADMIN — DIAZEPAM 5 MG: 5 TABLET ORAL at 09:03

## 2023-09-07 LAB — REF LAB TEST METHOD: NORMAL

## 2023-09-24 ENCOUNTER — TRANSCRIBE ORDERS (OUTPATIENT)
Dept: CT IMAGING | Facility: HOSPITAL | Age: 63
End: 2023-09-24
Payer: MEDICARE

## 2023-09-24 DIAGNOSIS — C61 PROSTATE CANCER: Primary | ICD-10-CM

## 2023-09-29 ENCOUNTER — HOSPITAL ENCOUNTER (OUTPATIENT)
Dept: NUCLEAR MEDICINE | Facility: HOSPITAL | Age: 63
Discharge: HOME | End: 2023-09-29
Payer: MEDICARE

## 2023-09-29 ENCOUNTER — HOSPITAL ENCOUNTER (OUTPATIENT)
Dept: CT IMAGING | Facility: HOSPITAL | Age: 63
Discharge: HOME | End: 2023-09-29
Payer: MEDICARE

## 2023-09-29 ENCOUNTER — LAB (OUTPATIENT)
Dept: LAB | Facility: HOSPITAL | Age: 63
End: 2023-09-29
Payer: MEDICARE

## 2023-09-29 DIAGNOSIS — C61 PROSTATE CANCER: ICD-10-CM

## 2023-09-29 LAB
BUN SERPL-MCNC: 8 MG/DL (ref 8–23)
CREAT SERPL-MCNC: 0.99 MG/DL (ref 0.76–1.27)
EGFRCR SERPLBLD CKD-EPI 2021: 85.6 ML/MIN/1.73

## 2023-09-29 PROCEDURE — 25510000001 IOPAMIDOL 61 % SOLUTION: Performed by: UROLOGY

## 2023-09-29 PROCEDURE — 74178 CT ABD&PLV WO CNTR FLWD CNTR: CPT

## 2023-09-29 PROCEDURE — 78306 BONE IMAGING WHOLE BODY: CPT

## 2023-09-29 PROCEDURE — 0 TECHNETIUM MEDRONATE KIT: Performed by: UROLOGY

## 2023-09-29 PROCEDURE — 36415 COLL VENOUS BLD VENIPUNCTURE: CPT

## 2023-09-29 PROCEDURE — 82565 ASSAY OF CREATININE: CPT

## 2023-09-29 PROCEDURE — A9503 TC99M MEDRONATE: HCPCS | Performed by: UROLOGY

## 2023-09-29 PROCEDURE — 84520 ASSAY OF UREA NITROGEN: CPT

## 2023-09-29 RX ORDER — TC 99M MEDRONATE 20 MG/10ML
27.5 INJECTION, POWDER, LYOPHILIZED, FOR SOLUTION INTRAVENOUS
Status: COMPLETED | OUTPATIENT
Start: 2023-09-29 | End: 2023-09-29

## 2023-09-29 RX ADMIN — Medication 27.5 MILLICURIE: at 09:23

## 2023-09-29 RX ADMIN — IOPAMIDOL 90 ML: 612 INJECTION, SOLUTION INTRAVENOUS at 10:13
